# Patient Record
Sex: FEMALE | NOT HISPANIC OR LATINO
[De-identification: names, ages, dates, MRNs, and addresses within clinical notes are randomized per-mention and may not be internally consistent; named-entity substitution may affect disease eponyms.]

---

## 2023-06-13 ENCOUNTER — APPOINTMENT (OUTPATIENT)
Dept: ORTHOPEDIC SURGERY | Facility: CLINIC | Age: 63
End: 2023-06-13
Payer: SELF-PAY

## 2023-06-13 ENCOUNTER — APPOINTMENT (OUTPATIENT)
Dept: ORTHOPEDIC SURGERY | Facility: CLINIC | Age: 63
End: 2023-06-13

## 2023-06-13 VITALS — HEIGHT: 65 IN | BODY MASS INDEX: 32.49 KG/M2 | WEIGHT: 195 LBS

## 2023-06-13 DIAGNOSIS — L08.9 LOCAL INFECTION OF THE SKIN AND SUBCUTANEOUS TISSUE, UNSPECIFIED: ICD-10-CM

## 2023-06-13 DIAGNOSIS — M77.12 LATERAL EPICONDYLITIS, LEFT ELBOW: ICD-10-CM

## 2023-06-13 PROCEDURE — 73140 X-RAY EXAM OF FINGER(S): CPT | Mod: RT

## 2023-06-13 PROCEDURE — 73080 X-RAY EXAM OF ELBOW: CPT | Mod: LT

## 2023-06-13 PROCEDURE — 99203 OFFICE O/P NEW LOW 30 MIN: CPT

## 2023-06-13 NOTE — DISCUSSION/SUMMARY
[de-identified] : Discussed the nature of the diagnosis and risk and benefits of different modalities of treatment.\par The soreness that she is experiencing in her thumb is expected. \par OT for desensitization \par \par Discussed the likelihood of symptoms resolving with time. \par Recommended a modified lifting technique.\par Start OT. \par All questions answered. \par RTO 4 weeks.\par

## 2023-06-13 NOTE — PHYSICAL EXAM
[Left] : left elbow [] : lateral elbow pain with resisted wrist extension [Right] : right hand [FreeTextEntry3] : 2 cm oblique scar, radial pad \par EPl and FPL functioning

## 2023-06-13 NOTE — HISTORY OF PRESENT ILLNESS
[4] : 4 [0] : 0 [Localized] : localized [Sharp] : sharp [de-identified] : 63 year old female presenting with RIGHT thumb pain. She states she had an infection in the thumb and was treated in [facility] 6 months ago. She was given abx. She has pain with contact. She feels as if something stuck in the thumb. She felt something pinch her while eating corn. \par \par She is also with LT elbow pain for the past 6 weeks. Mostly with lifting. No injury/trauma.  [] : Post Surgical Visit: no [FreeTextEntry1] : R thumb L elbow  [FreeTextEntry3] : 12/2022 [FreeTextEntry5] : PT is a 64 y/o RHD F p/w 6 wks of atraumatic L elbow pain and mo of atraumatic R thumb pain. R thumb is due to an infection. No prior TX for either.  [de-identified] : None

## 2024-08-19 ENCOUNTER — APPOINTMENT (OUTPATIENT)
Dept: ORTHOPEDIC SURGERY | Facility: CLINIC | Age: 64
End: 2024-08-19

## 2024-08-19 DIAGNOSIS — M17.0 BILATERAL PRIMARY OSTEOARTHRITIS OF KNEE: ICD-10-CM

## 2024-08-19 PROCEDURE — 99203 OFFICE O/P NEW LOW 30 MIN: CPT

## 2024-08-19 PROCEDURE — 73564 X-RAY EXAM KNEE 4 OR MORE: CPT | Mod: 50

## 2024-10-01 ENCOUNTER — APPOINTMENT (OUTPATIENT)
Dept: ORTHOPEDIC SURGERY | Facility: CLINIC | Age: 64
End: 2024-10-01